# Patient Record
Sex: FEMALE | Race: WHITE
[De-identification: names, ages, dates, MRNs, and addresses within clinical notes are randomized per-mention and may not be internally consistent; named-entity substitution may affect disease eponyms.]

---

## 2017-12-22 ENCOUNTER — HOSPITAL ENCOUNTER (EMERGENCY)
Dept: HOSPITAL 10 - FTE | Age: 77
Discharge: HOME | End: 2017-12-22
Payer: COMMERCIAL

## 2017-12-22 VITALS — HEIGHT: 55 IN | WEIGHT: 125.22 LBS | BODY MASS INDEX: 28.98 KG/M2

## 2017-12-22 DIAGNOSIS — N39.0: Primary | ICD-10-CM

## 2017-12-22 LAB
ADD UMIC: YES
BACTERIA #/AREA URNS HPF: (no result) /HPF
COLOR UR: YELLOW
GLUCOSE UR STRIP-MCNC: (no result) MG/DL
KETONES UR STRIP.AUTO-MCNC: NEGATIVE MG/DL
NITRITE UR QL STRIP.AUTO: POSITIVE MG/DL
RBC # UR AUTO: (no result) MG/DL
RENAL EPI CELLS #/AREA URNS HPF: (no result) /HPF
UR ASCORBIC ACID: 40 MG/DL
UR BILIRUBIN (DIP): NEGATIVE MG/DL
UR CLARITY: (no result)
UR PH (DIP): 6 (ref 5–9)
UR RBC: > 182 /HPF (ref 0–5)
UR SPECIFIC GRAVITY (DIP): 1.02 (ref 1–1.03)
UR TOTAL PROTEIN (DIP): (no result) MG/DL
UROBILINOGEN UR STRIP-ACNC: NEGATIVE MG/DL
WBC # UR STRIP: (no result) LEU/UL

## 2017-12-22 PROCEDURE — 81001 URINALYSIS AUTO W/SCOPE: CPT

## 2017-12-22 PROCEDURE — 99283 EMERGENCY DEPT VISIT LOW MDM: CPT

## 2017-12-22 PROCEDURE — 87086 URINE CULTURE/COLONY COUNT: CPT

## 2017-12-22 NOTE — ERD
ER Documentation


Chief Complaint


Chief Complaint


PAIN WITH URINATION, ONSET 2 DAYS, NO N/V





HPI


This is a 77-year-old female who presents emergency department today for pain 

with urination that started this morning.  Patient states that she feels like 

she wants to go and only goes a small amount.  States she has also had some 

constipation but has had a bowel movement within the past 2 days.  She states 

that her stool is very hard.  Denies any fevers or chills, vomiting, abdominal 

pain back pain.





ROS


All systems reviewed and are negative except as per history of present illness.





Medications


Home Meds


Active Scripts


Docusate Sodium* (Colace*) 100 Mg Capsule, 100 MG PO TID, #30 CAP


   Prov:AMBER SIDDIQUI PA-C         12/22/17


Polyethylene Glycol* (Miralax*) 17 Gm Powd.pack, 17 GM PO DAILY, #14


   Prov:AMBER SIDDIQUI PA-C         12/22/17


Cephalexin* (Keflex*) 500 Mg Capsule, 500 MG PO QID for 7 Days, CAP


   Prov:AMBER SIDDIQUI PA-C         12/22/17


Acetaminophen* (Tylophen*) 500 Mg Capsule, 1 CAP PO Q6H Y for PAIN AND OR 

ELEVATED TEMP, #30 CAP


   Prov:AMBER SIDDIQUI PA-C         12/22/17


Reported Medications


[Multivitamin]   No Conflict Check, DAILY


   9/2/11


[Herbal Suplement]   No Conflict Check, DAILY


   9/2/11


[Vitamins]   No Conflict Check


   4/9/11





Allergies


Allergies:  


Coded Allergies:  


     Unknown: Unable to obtain (Verified  Allergy, Mild, 4/9/11)


     No Known Allergies (Verified  Allergy, 9/2/11)





PMhx/Soc


History of Surgery:  Yes (HYSTERECTOMY, RIGHT BREAST BIOPSY, TUBAL PREGNANCY)


Anesthesia Reaction:  No


Hx Neurological Disorder:  No


Hx Respiratory Disorders:  No


Hx Cardiac Disorders:  No


Hx Psychiatric Problems:  No


Hx Miscellaneous Medical Probl:  No


Hx Alcohol Use:  No


Hx Substance Use:  No


Hx Tobacco Use:  No





Physical Exam


Vitals





Vital Signs








  Date Time  Temp Pulse Resp B/P Pulse Ox O2 Delivery O2 Flow Rate FiO2


 


12/22/17 12:27 98.7 102 18 183/89 98   








Physical Exam


Const:     NAD


Head:   Atraumatic 


Eyes:    Normal Conjunctiva


ENT:    Normal External Ears, Nose and Mouth.


Neck:               Full range of motion..~ No meningismus.


Resp:    Clear to auscultation bilaterally


Cardio:    Regular rate and rhythm, no murmurs


Abd:    Soft, non tender, non distended. Normal bowel sounds


Skin:    No petechiae or rashes


Back:    No midline or flank tenderness. NO CVA


Ext:    No cyanosis, or edema


Neur:    Awake and alert


Psych:    Normal Mood and Affect


Results 24 hrs





 Laboratory Tests








Test


  12/22/17


13:00


 


Urine Color YELLOW 


 


Urine Clarity CLOUDY 


 


Urine pH 6.0 


 


Urine Specific Gravity 1.018 


 


Urine Ketones NEGATIVEmg/dL 


 


Urine Nitrite POSITIVEmg/dL 


 


Urine Bilirubin NEGATIVEmg/dL 


 


Urine Urobilinogen NEGATIVEmg/dL 


 


Urine Leukocyte Esterase 3+Shavonne/ul 


 


Urine Microscopic RBC > 182/HPF 


 


Urine Microscopic /HPF 


 


Urine Renal Epithelial Cells FEW/HPF 


 


Urine Bacteria MANY/HPF 


 


Urine Hemoglobin 3+mg/dL 


 


Urine Glucose 2+mg/dL 


 


Urine Total Protein 2+mg/dl 








 Current Medications








 Medications


  (Trade)  Dose


 Ordered  Sig/Brandt


 Route


 PRN Reason  Start Time


 Stop Time Status Last Admin


Dose Admin


 


 Cephalexin


  (Keflex)  500 mg  ONCE  ONCE


 PO


   12/22/17 14:00


 12/22/17 14:01 DC 12/22/17 13:51


 











Procedures/MDM


This is a  77-year-old female who presents emergency department today 

complaining of pain with urination that started this morning.  States that she 

does have a primary care doctor Dr. Carrizales but was unable to be seen as he was 

at a meeting today.  Patient denies any abdominal pain on physical exam 

although she did report difficulties with having bowel movements.  She did 

report having a bowel movement within the past 2 days and denies any abdominal 

pain.  She no abdominal pain on physical exam.  She is afebrile and otherwise 

well-appearing however given patient's complaints I did obtain a UA





UA shows positive nitrites, 3+ leukocyte esterase 170 microscopic white blood 

cells and greater than 182 red blood cells.





Symptoms at this time is consistent with urinary tract infection.  Urine was 

sent for culture.  Again patient has no abdominal pain on physical exam and she 

is afebrile and otherwise well-appearing.  She has no back pain or CVA 

tenderness low suspicion for pyelonephritis at this time.  Given the 

significant hematuria cannot rule out nephrolithiasis.  I have explained this 

to the patient.





Patient was given her first dose of Keflex here in the emergency department.  

She will be given a prescription for Keflex, Tylenol, MiraLAX and Colace for 

home.  I have low suspicion for acute surgical abdomen given her physical exam 

and no complaints of abdominal pain.  Low suspicion for bowel obstruction.





At this time the patient is stable for discharge and outpatient management. 

Patient should follow up with their PCP in the next 1-2 days.  They may return 

to the emergency department sooner for any persistent or worsening of symptoms.

  Patient understood and agreed with the plan.





Discussed the patient with Dr. Reed and he is in agreement with the plan





Departure


Diagnosis:  


 Primary Impression:  


 UTI (urinary tract infection)


 Urinary tract infection type:  site unspecified  Hematuria presence:  with 

hematuria  Qualified Code:  N39.0 - Urinary tract infection with hematuria, 

site unspecified


Condition:  Fair











AMBER SIDDIQUI PA-C Dec 22, 2017 13:41

## 2022-11-19 ENCOUNTER — HOSPITAL ENCOUNTER (EMERGENCY)
Dept: HOSPITAL 54 - ER | Age: 82
Discharge: ANOTHER HOSPITAL | End: 2022-11-19
Payer: MEDICARE

## 2022-11-19 VITALS — BODY MASS INDEX: 24.73 KG/M2 | HEIGHT: 61 IN | WEIGHT: 131 LBS

## 2022-11-19 VITALS — SYSTOLIC BLOOD PRESSURE: 144 MMHG | DIASTOLIC BLOOD PRESSURE: 76 MMHG

## 2022-11-19 DIAGNOSIS — S06.2XAA: Primary | ICD-10-CM

## 2022-11-19 DIAGNOSIS — Z20.822: ICD-10-CM

## 2022-11-19 DIAGNOSIS — F03.90: ICD-10-CM

## 2022-11-19 DIAGNOSIS — M47.892: ICD-10-CM
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
## 2022-11-19 DIAGNOSIS — R40.2362: ICD-10-CM

## 2022-11-19 DIAGNOSIS — R29.6: ICD-10-CM

## 2022-11-19 DIAGNOSIS — R40.2142: ICD-10-CM
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
## 2022-11-19 DIAGNOSIS — R40.2242: ICD-10-CM

## 2022-11-19 DIAGNOSIS — S62.102D: ICD-10-CM
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
## 2022-11-19 LAB
BASOPHILS # BLD AUTO: 0 K/UL (ref 0–0.2)
BASOPHILS NFR BLD AUTO: 0.3 % (ref 0–2)
BILIRUB UR QL STRIP: NEGATIVE
BUN SERPL-MCNC: 29 MG/DL (ref 7–18)
CALCIUM SERPL-MCNC: 9.2 MG/DL (ref 8.5–10.1)
CHLORIDE SERPL-SCNC: 104 MMOL/L (ref 98–107)
CO2 SERPL-SCNC: 27 MMOL/L (ref 21–32)
COLOR UR: YELLOW
CREAT SERPL-MCNC: 1.4 MG/DL (ref 0.6–1.3)
EOSINOPHIL NFR BLD AUTO: 0.2 % (ref 0–6)
GLUCOSE SERPL-MCNC: 132 MG/DL (ref 74–106)
GLUCOSE UR STRIP-MCNC: NEGATIVE MG/DL
HCT VFR BLD AUTO: 38 % (ref 33–45)
HGB BLD-MCNC: 12.5 G/DL (ref 11.5–14.8)
LEUKOCYTE ESTERASE UR QL STRIP: NEGATIVE
LYMPHOCYTES NFR BLD AUTO: 0.9 K/UL (ref 0.8–4.8)
LYMPHOCYTES NFR BLD AUTO: 9.9 % (ref 20–44)
MCHC RBC AUTO-ENTMCNC: 33 G/DL (ref 31–36)
MCV RBC AUTO: 94 FL (ref 82–100)
MONOCYTES NFR BLD AUTO: 0.7 K/UL (ref 0.1–1.3)
MONOCYTES NFR BLD AUTO: 8.3 % (ref 2–12)
NEUTROPHILS # BLD AUTO: 7 K/UL (ref 1.8–8.9)
NEUTROPHILS NFR BLD AUTO: 81.3 % (ref 43–81)
NITRITE UR QL STRIP: NEGATIVE
PH UR STRIP: 7 [PH] (ref 5–8)
PLATELET # BLD AUTO: 164 K/UL (ref 150–450)
POTASSIUM SERPL-SCNC: 4.1 MMOL/L (ref 3.5–5.1)
PROT UR QL STRIP: (no result) MG/DL
RBC # BLD AUTO: 3.97 MIL/UL (ref 4–5.2)
RBC #/AREA URNS HPF: (no result) /HPF (ref 0–2)
SODIUM SERPL-SCNC: 138 MMOL/L (ref 136–145)
UROBILINOGEN UR STRIP-MCNC: 0.2 EU/DL
WBC #/AREA URNS HPF: (no result) /HPF (ref 0–3)
WBC NRBC COR # BLD AUTO: 8.6 K/UL (ref 4.3–11)

## 2022-11-19 PROCEDURE — 71045 X-RAY EXAM CHEST 1 VIEW: CPT

## 2022-11-19 PROCEDURE — 70450 CT HEAD/BRAIN W/O DYE: CPT

## 2022-11-19 PROCEDURE — 99291 CRITICAL CARE FIRST HOUR: CPT

## 2022-11-19 PROCEDURE — 72125 CT NECK SPINE W/O DYE: CPT

## 2022-11-19 PROCEDURE — 87426 SARSCOV CORONAVIRUS AG IA: CPT

## 2022-11-19 PROCEDURE — 81001 URINALYSIS AUTO W/SCOPE: CPT

## 2022-11-19 PROCEDURE — C9803 HOPD COVID-19 SPEC COLLECT: HCPCS

## 2022-11-19 PROCEDURE — 80048 BASIC METABOLIC PNL TOTAL CA: CPT

## 2022-11-19 PROCEDURE — 99292 CRITICAL CARE ADDL 30 MIN: CPT

## 2022-11-19 PROCEDURE — 36415 COLL VENOUS BLD VENIPUNCTURE: CPT

## 2022-11-19 PROCEDURE — 85025 COMPLETE CBC W/AUTO DIFF WBC: CPT

## 2022-11-19 NOTE — NUR
NIH PERFORMED, PT ABLE TO SWALLOW NO COUGH NORMAL SWALLOW, LIFT OF RIGHT ARM 
HOLD 10 SECONDS, LIFT OF LEFT ARM DROPS IN 3 SECONDS, LIFT OF RIGHT LEG HOLD 
FOR 10 SECONDS, LIFT OF LEFT LEG HOLD FOR 3 SECONDS, DISCOLORATION NOTED ON 
LEFT HIP AND BUTTOCK FAMILY STATES FROM THE FALL FROM THE BED, ABRASION ON THE 
LEFT KNEE NOTED, LEFT HAND TX FOR FX

## 2022-11-19 NOTE — NUR
CALLED [facility] TRANSFER HOTLINE TO REGUEST HIGHER LEVEL OF CARE. BENITEZ 
WILL BE CALLING BACK

## 2022-11-19 NOTE — NUR
[id number] FROM HOME FOR C/O GLF WHILE GETTING OUT OF BED. L KNEE ABRASION AND 
PAIN. PT HAD GLF EARLIER TODAY WITH L WRSIT FRACTURE. PT AWAKE AND ALERT BUT 
DENIES FALL ON ASSESSMENT ORIENTED X2 TO NAME AND PLACE. DENIES HT, KO, OR 
BLOODTHINNER USE. MD WAS AT BEDSIDE FOR EVAL.

## 2022-11-19 NOTE — NUR
ARRON SANTACRUZ FOR [facility] CALLED BACK TO INFORM THAT  TIME IS @ 
0900. PER ARRON SANTACRUZ, DR. CHAN - NEURO SURGEON CAN BE TRANSPORTED AT THAT 
TIME. CONTINUE TO DO NEURO CHECK AND VITALS. CALL [facility] TRANSFER LINE FOR AND 
CHANGES ON PT. ER MADE IS AWARE.

## 2022-11-19 NOTE — NUR
CONTACTED [facility]. AWAITING CALL BACK FROM MD. GUDINO AND CT SENT TO [facility].

## 2022-11-19 NOTE — NUR
RECEIVED CALL FROM FIDEL STACY/MORENA SANTACRUZ, PROVIDED PT TRANSFER REPORT. ALL 
PERTINENT PT MEDICAL INFO PROVIDED. SHE ACKNOWLEDGED. ADVSIED FACESHEET, 
CLINICALS AND RESULTS WILL BE FAXED TO THEM. AWAITING FOR PATIENT .